# Patient Record
Sex: FEMALE | Race: WHITE
[De-identification: names, ages, dates, MRNs, and addresses within clinical notes are randomized per-mention and may not be internally consistent; named-entity substitution may affect disease eponyms.]

---

## 2022-03-18 PROBLEM — M77.8 TENDONITIS OF ELBOW, RIGHT: Status: ACTIVE | Noted: 2021-07-14

## 2022-03-18 PROBLEM — E66.01 SEVERE OBESITY (HCC): Status: ACTIVE | Noted: 2019-10-09

## 2022-03-19 PROBLEM — I10 ESSENTIAL HYPERTENSION: Status: ACTIVE | Noted: 2021-07-14

## 2022-03-19 PROBLEM — N93.9 DISORDER OF MENSTRUAL BLEEDING: Status: ACTIVE | Noted: 2019-10-09

## 2022-03-19 PROBLEM — F90.0 ADHD, PREDOMINANTLY INATTENTIVE TYPE: Status: ACTIVE | Noted: 2021-07-14

## 2022-03-19 PROBLEM — Z30.09 FAMILY PLANNING: Status: ACTIVE | Noted: 2019-10-09

## 2022-03-19 PROBLEM — F90.9 ADHD: Status: ACTIVE | Noted: 2019-10-09

## 2022-03-19 PROBLEM — N92.6 DISORDER OF MENSTRUAL BLEEDING: Status: ACTIVE | Noted: 2019-10-09

## 2022-06-06 ENCOUNTER — TELEPHONE (OUTPATIENT)
Dept: PRIMARY CARE CLINIC | Facility: CLINIC | Age: 32
End: 2022-06-06

## 2022-06-06 DIAGNOSIS — F90.0 ADHD, PREDOMINANTLY INATTENTIVE TYPE: Primary | ICD-10-CM

## 2022-06-06 RX ORDER — DEXTROAMPHETAMINE SACCHARATE, AMPHETAMINE ASPARTATE, DEXTROAMPHETAMINE SULFATE AND AMPHETAMINE SULFATE 7.5; 7.5; 7.5; 7.5 MG/1; MG/1; MG/1; MG/1
TABLET ORAL
Qty: 60 TABLET | Refills: 0 | Status: SHIPPED | OUTPATIENT
Start: 2022-06-06 | End: 2022-07-14 | Stop reason: SDUPTHER

## 2022-06-06 NOTE — TELEPHONE ENCOUNTER
: Message to Provider     QUESTIONS   Information for Provider? Patient attempted to schedule her regular appt   for refills on controlled substance and first available appt offered was   6/22 which is AFTER her meds will run out. She'll be out on THIS friday   6/10. She said she's repeated attempted to reach out through [address], but   the system is all messed up. Patient will also schedule her NEXT 3mo visit   as well.   ---------------------------------------------------------------------------   --------------   CALL BACK INFO   What is the best way for the office to contact you? OK to leave message on   voicemail   Preferred Call Back Phone Number? [phone]   ---------------------------------------------------------------------------   --------------   SCRIPT ANSWERS   Relationship to Patient?  Self

## 2022-07-14 ENCOUNTER — TELEMEDICINE (OUTPATIENT)
Dept: PRIMARY CARE CLINIC | Facility: CLINIC | Age: 32
End: 2022-07-14

## 2022-07-14 DIAGNOSIS — M77.8 TENDONITIS OF ELBOW, RIGHT: Primary | ICD-10-CM

## 2022-07-14 DIAGNOSIS — F90.0 ADHD, PREDOMINANTLY INATTENTIVE TYPE: ICD-10-CM

## 2022-07-14 DIAGNOSIS — I10 ESSENTIAL HYPERTENSION: ICD-10-CM

## 2022-07-14 PROCEDURE — 99442 PR PHYS/QHP TELEPHONE EVALUATION 11-20 MIN: CPT | Performed by: FAMILY MEDICINE

## 2022-07-14 RX ORDER — DEXTROAMPHETAMINE SACCHARATE, AMPHETAMINE ASPARTATE, DEXTROAMPHETAMINE SULFATE AND AMPHETAMINE SULFATE 7.5; 7.5; 7.5; 7.5 MG/1; MG/1; MG/1; MG/1
TABLET ORAL
Qty: 60 TABLET | Refills: 0 | Status: SHIPPED | OUTPATIENT
Start: 2022-09-14 | End: 2022-09-20 | Stop reason: SDUPTHER

## 2022-07-14 RX ORDER — DEXTROAMPHETAMINE SACCHARATE, AMPHETAMINE ASPARTATE, DEXTROAMPHETAMINE SULFATE AND AMPHETAMINE SULFATE 7.5; 7.5; 7.5; 7.5 MG/1; MG/1; MG/1; MG/1
TABLET ORAL
Qty: 60 TABLET | Refills: 0 | Status: SHIPPED | OUTPATIENT
Start: 2022-08-14 | End: 2022-10-19 | Stop reason: SDUPTHER

## 2022-07-14 RX ORDER — DEXTROAMPHETAMINE SACCHARATE, AMPHETAMINE ASPARTATE, DEXTROAMPHETAMINE SULFATE AND AMPHETAMINE SULFATE 7.5; 7.5; 7.5; 7.5 MG/1; MG/1; MG/1; MG/1
TABLET ORAL
Qty: 60 TABLET | Refills: 0 | Status: SHIPPED | OUTPATIENT
Start: 2022-07-14 | End: 2022-10-19 | Stop reason: SDUPTHER

## 2022-07-14 RX ORDER — LISINOPRIL 5 MG/1
5 TABLET ORAL DAILY
Qty: 90 TABLET | Refills: 3 | Status: SHIPPED | OUTPATIENT
Start: 2022-07-14

## 2022-07-14 NOTE — PROGRESS NOTES
Lewis De Oliveira (:  1990) is a Established patient, here for evaluation of the following:    Assessment & Plan   Below is the assessment and plan developed based on review of pertinent history, physical exam, labs, studies, and medications. 1. ADHD, predominantly inattentive type  -     amphetamine-dextroamphetamine (ADDERALL) 30 MG tablet; One tablet po daily and 1/2 tablet po at noon, 1/2 tablet po in pm prn (date correction), Disp-60 tablet, R-0Normal  -     amphetamine-dextroamphetamine (ADDERALL, 30MG,) 30 MG tablet; One tablet in am po and 1/2 tablet at noon and 1/2 tablet at pm, Disp-60 tablet, R-0Normal  -     amphetamine-dextroamphetamine (ADDERALL, 30MG,) 30 MG tablet; One tablet in am and 1/2 at noon, and 1/2 po in pm, Disp-60 tablet, R-0Normal  Return recheck cmp, lipid, cbc, tsh, free t4, hgba1c, vit d, keep scheduled appt, for for labs one week before appt. Subjective   HPI:  26 yo female who presents for follow up visit, medication refills. Pt reports she is doing okay on current medication therapy, but does need refills on all meds. Pt is given adderall 30mg bid dosing. Pt reports no adverse side effects, needs medication and is aware when she does not have it. Refills are given. Pt is also given refills on voltaren gel for joint pain (arthritis) multiple areas, and on lisinopril  5mg for BP management. Pt reports stable on these meds. Pt does not need refills on BCPs at this time. Pt is advised RTC in 3 months, recheck, med refills. Labs are indicated, cmp, lipid, cbc, tsh, free t4, hgba1c, vit d    Review of Systems   Constitutional: Negative. Pt is needing medication refills, doing okay on current therapy, per pt report. HENT: Negative. Eyes: Negative. Respiratory: Negative. Cardiovascular: Negative. Elevated BP without meds   Gastrointestinal: Negative. Endocrine: Negative. Genitourinary: Negative.     Musculoskeletal:  Positive for arthralgias. Negative for joint swelling. Joint pain (arthritis)   Allergic/Immunologic: Negative. Neurological: Negative. Hematological: Negative. Psychiatric/Behavioral:          Adult ADHD, stable on medications        Objective   Patient-Reported Vitals  No data recorded            On this date 7/14/2022 I have spent 11-20 minutes reviewing previous notes, test results and face to face (virtual) with the patient discussing the diagnosis and importance of compliance with the treatment plan as well as documenting on the day of the visit. [address], was evaluated through a synchronous (real-time) audio-video encounter. The patient (or guardian if applicable) is aware that this is a billable service, which includes applicable co-pays. This Virtual Visit was conducted with patient's (and/or legal guardian's) consent. The visit was conducted pursuant to the emergency declaration under the [facility], [address] [place] [address] [facility] authority and the [employer] and [employer] General Act. Patient identification was verified, and a caregiver was present when appropriate. The patient was located at Home: [address] [place] [id number]. Provider was located at [facility] Rhys [address] [place] [address]..         --Pool Oneil MD

## 2022-07-15 ASSESSMENT — ENCOUNTER SYMPTOMS
EYES NEGATIVE: 1
ALLERGIC/IMMUNOLOGIC NEGATIVE: 1
RESPIRATORY NEGATIVE: 1
GASTROINTESTINAL NEGATIVE: 1

## 2022-09-20 ENCOUNTER — TELEPHONE (OUTPATIENT)
Dept: PRIMARY CARE CLINIC | Facility: CLINIC | Age: 32
End: 2022-09-20

## 2022-09-20 DIAGNOSIS — F90.0 ADHD, PREDOMINANTLY INATTENTIVE TYPE: ICD-10-CM

## 2022-09-20 RX ORDER — DEXTROAMPHETAMINE SACCHARATE, AMPHETAMINE ASPARTATE, DEXTROAMPHETAMINE SULFATE AND AMPHETAMINE SULFATE 7.5; 7.5; 7.5; 7.5 MG/1; MG/1; MG/1; MG/1
TABLET ORAL
Qty: 60 TABLET | Refills: 0 | Status: SHIPPED | OUTPATIENT
Start: 2022-09-20 | End: 2022-10-19 | Stop reason: SDUPTHER

## 2022-09-20 NOTE — PROGRESS NOTES
Adderall sent to [facility]. Pthas one month of med remaining only dated 9/14. Requires appt to obtain another refill.

## 2022-09-20 NOTE — TELEPHONE ENCOUNTER
Pt called in stating that she needs her adderall to [facility] [address]      Please call pt back

## 2022-10-19 ENCOUNTER — E-VISIT (OUTPATIENT)
Dept: PRIMARY CARE CLINIC | Facility: CLINIC | Age: 32
End: 2022-10-19

## 2022-10-19 DIAGNOSIS — F90.0 ADHD, PREDOMINANTLY INATTENTIVE TYPE: ICD-10-CM

## 2022-10-19 PROCEDURE — 99442 PR PHYS/QHP TELEPHONE EVALUATION 11-20 MIN: CPT | Performed by: FAMILY MEDICINE

## 2022-10-19 RX ORDER — DEXTROAMPHETAMINE SACCHARATE, AMPHETAMINE ASPARTATE, DEXTROAMPHETAMINE SULFATE AND AMPHETAMINE SULFATE 7.5; 7.5; 7.5; 7.5 MG/1; MG/1; MG/1; MG/1
TABLET ORAL
Qty: 60 TABLET | Refills: 0 | Status: SHIPPED | OUTPATIENT
Start: 2022-10-19 | End: 2022-11-19

## 2022-10-19 RX ORDER — DEXTROAMPHETAMINE SACCHARATE, AMPHETAMINE ASPARTATE, DEXTROAMPHETAMINE SULFATE AND AMPHETAMINE SULFATE 7.5; 7.5; 7.5; 7.5 MG/1; MG/1; MG/1; MG/1
TABLET ORAL
Qty: 60 TABLET | Refills: 0 | Status: SHIPPED | OUTPATIENT
Start: 2022-12-19 | End: 2023-01-19

## 2022-10-19 RX ORDER — DEXTROAMPHETAMINE SACCHARATE, AMPHETAMINE ASPARTATE, DEXTROAMPHETAMINE SULFATE AND AMPHETAMINE SULFATE 7.5; 7.5; 7.5; 7.5 MG/1; MG/1; MG/1; MG/1
TABLET ORAL
Qty: 60 TABLET | Refills: 0 | Status: SHIPPED | OUTPATIENT
Start: 2022-11-19 | End: 2022-12-18

## 2022-10-19 NOTE — PROGRESS NOTES
Lewis De Oliveira (:  1990) is a Established patient, here for evaluation of the following:    Assessment & Plan   Below is the assessment and plan developed based on review of pertinent history, physical exam, labs, studies, and medications. {There are no diagnoses linked to this encounter. (Refresh or delete this SmartLink)}  No follow-ups on file. Subjective   HPI:   [facility]  Mom breast    [facility], [place], [place]    Pt will be seeing an obgyn on the  will have a pap, and bleeding   Review of Systems       Objective   Patient-Reported Vitals  No data recorded     Physical Exam  {Virtual visit PE with detailed exam Optional:522003654}       {Time Documentation Optional:415940450}    Lewis De Oliveira, was evaluated through a synchronous (real-time) audio-video encounter. The patient (or guardian if applicable) is aware that this is a billable service, which includes applicable co-pays. This Virtual Visit was conducted with patient's (and/or legal guardian's) consent. The visit was conducted pursuant to the emergency declaration under the [address] [place] [address] [facility] authority and the [employer] and [facility] General Act. Patient identification was verified, and a caregiver was present when appropriate. The patient was located at {[facility] [employer] - Patient:[id number]}. Provider was located at {[employer] - Provider:[id number]}.         --Blair Blake MD

## 2022-10-20 NOTE — PROGRESS NOTES
Abbey No is a 27 y.o. female evaluated via telephone on 10/19/2022 for No chief complaint on file. .        Documentation:  I communicated with the patient and/or health care decision maker about medication refills and other health concerns. Pt is worried about breast cancer and will be seeing gyn physician to discuss surveillance when she has pap. Advised to discuss BRACA gene and if GYN felt this was something to be considered in their discussion or not, as this would have health implications, but if not present would change course of treatment indicated. Pt presents for refills of adderall, and is given two tablets 20mg daily, to take one tablet in am, 1/2 tablet at noon and 1/2 tablet in pm for best coverage of day, best results. Pt has done this before and feels is best way for her to use medication. Pt is given refills of med, 3 months. Pt will RTC in 3 months, for recheck, telemed is okay. .   Details of this discussion including any medical advice provided: as noted    Total Time: minutes: 11-20 minutes    Abbey No was evaluated through a synchronous (real-time) audio encounter. Patient identification was verified at the start of the visit. She (or guardian if applicable) is aware that this is a billable service, which includes applicable co-pays. This visit was conducted with the patient's (and/or legal guardian's) verbal consent. She has not had a related appointment within my department in the past 7 days or scheduled within the next 24 hours. The patient was located at Home: [address] [place] [address]. The provider was located at [facility] ([address] [place], [place] [address]t): [facility]  [facility]..     Note: not billable if this call serves to triage the patient into an appointment for the relevant concern    Bo Priest MD

## 2022-12-19 ENCOUNTER — TELEPHONE (OUTPATIENT)
Dept: PRIMARY CARE CLINIC | Facility: CLINIC | Age: 32
End: 2022-12-19

## 2022-12-19 DIAGNOSIS — F90.0 ADHD, PREDOMINANTLY INATTENTIVE TYPE: ICD-10-CM

## 2022-12-19 RX ORDER — DEXTROAMPHETAMINE SACCHARATE, AMPHETAMINE ASPARTATE, DEXTROAMPHETAMINE SULFATE AND AMPHETAMINE SULFATE 7.5; 7.5; 7.5; 7.5 MG/1; MG/1; MG/1; MG/1
TABLET ORAL
Qty: 60 TABLET | Refills: 0 | Status: SHIPPED | OUTPATIENT
Start: 2022-12-19 | End: 2023-01-19

## 2023-01-20 ENCOUNTER — TELEPHONE (OUTPATIENT)
Dept: PRIMARY CARE CLINIC | Facility: CLINIC | Age: 33
End: 2023-01-20

## 2023-01-20 ENCOUNTER — TELEMEDICINE (OUTPATIENT)
Dept: PRIMARY CARE CLINIC | Facility: CLINIC | Age: 33
End: 2023-01-20

## 2023-01-20 DIAGNOSIS — M77.8 TENDONITIS OF ELBOW, RIGHT: ICD-10-CM

## 2023-01-20 DIAGNOSIS — F90.0 ADHD, PREDOMINANTLY INATTENTIVE TYPE: Primary | ICD-10-CM

## 2023-01-20 DIAGNOSIS — F90.0 ADHD, PREDOMINANTLY INATTENTIVE TYPE: ICD-10-CM

## 2023-01-20 DIAGNOSIS — Z90.710 S/P HYSTERECTOMY: ICD-10-CM

## 2023-01-20 PROCEDURE — 99442 PR PHYS/QHP TELEPHONE EVALUATION 11-20 MIN: CPT | Performed by: FAMILY MEDICINE

## 2023-01-20 RX ORDER — DEXTROAMPHETAMINE SACCHARATE, AMPHETAMINE ASPARTATE, DEXTROAMPHETAMINE SULFATE AND AMPHETAMINE SULFATE 7.5; 7.5; 7.5; 7.5 MG/1; MG/1; MG/1; MG/1
TABLET ORAL
Qty: 60 TABLET | Refills: 0 | Status: SHIPPED | OUTPATIENT
Start: 2023-02-21 | End: 2023-03-20

## 2023-01-20 RX ORDER — DEXTROAMPHETAMINE SACCHARATE, AMPHETAMINE ASPARTATE, DEXTROAMPHETAMINE SULFATE AND AMPHETAMINE SULFATE 7.5; 7.5; 7.5; 7.5 MG/1; MG/1; MG/1; MG/1
TABLET ORAL
Qty: 60 TABLET | Refills: 0 | Status: SHIPPED | OUTPATIENT
Start: 2023-03-21 | End: 2023-04-21

## 2023-01-20 RX ORDER — DEXTROAMPHETAMINE SACCHARATE, AMPHETAMINE ASPARTATE, DEXTROAMPHETAMINE SULFATE AND AMPHETAMINE SULFATE 7.5; 7.5; 7.5; 7.5 MG/1; MG/1; MG/1; MG/1
TABLET ORAL
Qty: 60 TABLET | Refills: 0 | Status: SHIPPED | OUTPATIENT
Start: 2023-01-20 | End: 2023-02-20

## 2023-01-20 RX ORDER — DEXTROAMPHETAMINE SACCHARATE, AMPHETAMINE ASPARTATE, DEXTROAMPHETAMINE SULFATE AND AMPHETAMINE SULFATE 7.5; 7.5; 7.5; 7.5 MG/1; MG/1; MG/1; MG/1
TABLET ORAL
Qty: 60 TABLET | Refills: 0 | Status: SHIPPED | OUTPATIENT
Start: 2023-02-21 | End: 2023-01-20 | Stop reason: SDUPTHER

## 2023-01-20 NOTE — TELEPHONE ENCOUNTER
Patient called regarding her prescription. [facility] received prescription for March and February but did not see the one for January. Requesting that this be sent. I advised that I would get this message to Dr. Hernandez Heart today.

## 2023-01-20 NOTE — PROGRESS NOTES
Willard Mora is a 27 y.o. female evaluated via telephone on 1/20/2023 for No chief complaint on file. .        Documentation:  I communicated with the patient and/or health care decision maker about medication refills, adderall for Adult ADHD. Pt reports some difficulty obtaining her meds, sometimes from [facility], due to shortage of supply. Pt will let physician know if she needs resent to a different pharmacy when due to be filled. Pt is also given voltaren 1%, for joint pain. Pt reports a major life event with hysterectomy and removal of her uterus which had cancer cells internal to uterus. Pt says was an emotional time, but she has completed her family and is \"glad to have it out. \" As she feels much better overall and was told \"the cancer is all gone, completely removed with uterus. \"    Pt is commended for her maturity in this life event. Pt will RTC in 3 months for recheck, medication refills. Pt reports medication is working appropriately for her with no side effects of note. Details of this discussion including any medical advice provided: as noted. Total Time: minutes: 11-20 minutes    Willard Mora was evaluated through a synchronous (real-time) audio encounter. Patient identification was verified at the start of the visit. She (or guardian if applicable) is aware that this is a billable service, which includes applicable co-pays. This visit was conducted with the patient's (and/or legal guardian's) verbal consent. She has not had a related appointment within my department in the past 7 days or scheduled within the next 24 hours. The patient was located at Home: [address] [place] [address]. The provider was located at [facility] ([address] [place] [address] Dept): Rhys ,  [facility] 14..     Note: not billable if this call serves to triage the patient into an appointment for the relevant concern    Vadim Leos MD

## 2023-01-20 NOTE — TELEPHONE ENCOUNTER
Called [facility] to check her out of her appt, update her insurance, and schedule her next visit.  She did not answer so I left a message for her to call back

## 2023-01-23 DIAGNOSIS — F90.0 ADHD, PREDOMINANTLY INATTENTIVE TYPE: ICD-10-CM

## 2023-01-23 RX ORDER — DEXTROAMPHETAMINE SACCHARATE, AMPHETAMINE ASPARTATE, DEXTROAMPHETAMINE SULFATE AND AMPHETAMINE SULFATE 7.5; 7.5; 7.5; 7.5 MG/1; MG/1; MG/1; MG/1
TABLET ORAL
Qty: 60 TABLET | Refills: 0 | Status: SHIPPED | OUTPATIENT
Start: 2023-01-23 | End: 2023-02-23

## 2023-01-23 NOTE — PROGRESS NOTES
January adderall script, initially sent on 1/20/23 resent today 1/23/23. Uncertain why did not arrive on first try. Sent to [facility] as requested. It is possible was sent intially to [facility], uncertain.

## 2023-04-20 ENCOUNTER — TELEPHONE (OUTPATIENT)
Dept: PRIMARY CARE CLINIC | Facility: CLINIC | Age: 33
End: 2023-04-20

## 2023-04-20 DIAGNOSIS — F90.0 ADHD, PREDOMINANTLY INATTENTIVE TYPE: ICD-10-CM

## 2023-04-20 RX ORDER — DEXTROAMPHETAMINE SACCHARATE, AMPHETAMINE ASPARTATE, DEXTROAMPHETAMINE SULFATE AND AMPHETAMINE SULFATE 7.5; 7.5; 7.5; 7.5 MG/1; MG/1; MG/1; MG/1
TABLET ORAL
Qty: 60 TABLET | Refills: 0 | Status: SHIPPED | OUTPATIENT
Start: 2023-04-20 | End: 2023-05-21

## 2023-04-26 ENCOUNTER — TELEMEDICINE (OUTPATIENT)
Dept: PRIMARY CARE CLINIC | Facility: CLINIC | Age: 33
End: 2023-04-26

## 2023-04-26 ENCOUNTER — TELEPHONE (OUTPATIENT)
Dept: PRIMARY CARE CLINIC | Facility: CLINIC | Age: 33
End: 2023-04-26

## 2023-04-26 DIAGNOSIS — F90.0 ADHD, PREDOMINANTLY INATTENTIVE TYPE: Primary | ICD-10-CM

## 2023-04-26 DIAGNOSIS — I10 ESSENTIAL HYPERTENSION: ICD-10-CM

## 2023-04-26 DIAGNOSIS — Z90.710 S/P HYSTERECTOMY: ICD-10-CM

## 2023-04-26 PROBLEM — Z30.09 FAMILY PLANNING: Status: RESOLVED | Noted: 2019-10-09 | Resolved: 2023-04-26

## 2023-04-26 PROBLEM — N92.6 DISORDER OF MENSTRUAL BLEEDING: Status: RESOLVED | Noted: 2019-10-09 | Resolved: 2023-04-26

## 2023-04-26 PROBLEM — N93.9 DISORDER OF MENSTRUAL BLEEDING: Status: RESOLVED | Noted: 2019-10-09 | Resolved: 2023-04-26

## 2023-04-26 PROCEDURE — 99214 OFFICE O/P EST MOD 30 MIN: CPT | Performed by: FAMILY MEDICINE

## 2023-04-26 RX ORDER — DEXTROAMPHETAMINE SACCHARATE, AMPHETAMINE ASPARTATE, DEXTROAMPHETAMINE SULFATE AND AMPHETAMINE SULFATE 7.5; 7.5; 7.5; 7.5 MG/1; MG/1; MG/1; MG/1
TABLET ORAL
Qty: 60 TABLET | Refills: 0 | Status: SHIPPED | OUTPATIENT
Start: 2023-06-28 | End: 2023-07-27

## 2023-04-26 RX ORDER — DEXTROAMPHETAMINE SACCHARATE, AMPHETAMINE ASPARTATE, DEXTROAMPHETAMINE SULFATE AND AMPHETAMINE SULFATE 7.5; 7.5; 7.5; 7.5 MG/1; MG/1; MG/1; MG/1
TABLET ORAL
Qty: 60 TABLET | Refills: 0 | Status: SHIPPED | OUTPATIENT
Start: 2023-05-27 | End: 2023-06-27

## 2023-04-26 RX ORDER — DEXTROAMPHETAMINE SACCHARATE, AMPHETAMINE ASPARTATE, DEXTROAMPHETAMINE SULFATE AND AMPHETAMINE SULFATE 7.5; 7.5; 7.5; 7.5 MG/1; MG/1; MG/1; MG/1
TABLET ORAL
Qty: 60 TABLET | Refills: 0 | Status: SHIPPED | OUTPATIENT
Start: 2023-04-26 | End: 2023-05-26

## 2023-04-26 RX ORDER — LISINOPRIL 5 MG/1
5 TABLET ORAL DAILY
Qty: 90 TABLET | Refills: 3 | Status: SHIPPED | OUTPATIENT
Start: 2023-04-26

## 2023-04-26 SDOH — ECONOMIC STABILITY: HOUSING INSECURITY
IN THE LAST 12 MONTHS, WAS THERE A TIME WHEN YOU DID NOT HAVE A STEADY PLACE TO SLEEP OR SLEPT IN A SHELTER (INCLUDING NOW)?: NO

## 2023-04-26 SDOH — ECONOMIC STABILITY: INCOME INSECURITY: HOW HARD IS IT FOR YOU TO PAY FOR THE VERY BASICS LIKE FOOD, HOUSING, MEDICAL CARE, AND HEATING?: NOT HARD AT ALL

## 2023-04-26 SDOH — ECONOMIC STABILITY: FOOD INSECURITY: WITHIN THE PAST 12 MONTHS, YOU WORRIED THAT YOUR FOOD WOULD RUN OUT BEFORE YOU GOT MONEY TO BUY MORE.: NEVER TRUE

## 2023-04-26 SDOH — ECONOMIC STABILITY: FOOD INSECURITY: WITHIN THE PAST 12 MONTHS, THE FOOD YOU BOUGHT JUST DIDN'T LAST AND YOU DIDN'T HAVE MONEY TO GET MORE.: NEVER TRUE

## 2023-04-26 ASSESSMENT — PATIENT HEALTH QUESTIONNAIRE - PHQ9
2. FEELING DOWN, DEPRESSED OR HOPELESS: 0
SUM OF ALL RESPONSES TO PHQ QUESTIONS 1-9: 0
SUM OF ALL RESPONSES TO PHQ9 QUESTIONS 1 & 2: 0
SUM OF ALL RESPONSES TO PHQ QUESTIONS 1-9: 0
1. LITTLE INTEREST OR PLEASURE IN DOING THINGS: 0

## 2023-04-26 ASSESSMENT — ENCOUNTER SYMPTOMS
EYES NEGATIVE: 1
RESPIRATORY NEGATIVE: 1
ALLERGIC/IMMUNOLOGIC NEGATIVE: 1
GASTROINTESTINAL NEGATIVE: 1

## 2023-04-26 NOTE — PROGRESS NOTES
Lewis De Oliveira (:  1990) is a Established patient, presenting virtually for evaluation of the following:    Assessment & Plan   Below is the assessment and plan developed based on review of pertinent history, physical exam, labs, studies, and medications. 1. ADHD, predominantly inattentive type  -     amphetamine-dextroamphetamine (ADDERALL, 30MG,) 30 MG tablet; One tablet in am po and 1/2 tablet at noon and 1/2 tablet at pm, Disp-60 tablet, R-0Normal  -     amphetamine-dextroamphetamine (ADDERALL, 30MG,) 30 MG tablet; One tablet in am and 1/2 at noon, and 1/2 po in pm, visit required for additional refills. , Disp-60 tablet, R-0Normal  -     amphetamine-dextroamphetamine (ADDERALL) 30 MG tablet; One tablet po daily and 1/2 tablet po at noon, 1/2 tablet po in pm prn, Disp-60 tablet, R-0Normal  2. Essential hypertension  -     lisinopril (PRINIVIL;ZESTRIL) 5 MG tablet; Take 1 tablet by mouth daily, Disp-90 tablet, R-3Normal  3. S/P hysterectomy    Return in about 3 months (around 2023), or no labs indicated, due to labs from  [facility]. Subjective   HPI:34 yo female who presents for follow up visit. Pt is needing refills on adderall which have . Pt reports she needs three months of medications, adderall 30mg bid dose, which are sent to [facility] pharmacy, as per request. Pt reports no problems with current medications. Pt is also given refills on lisinopril for BP management. Pt reports medication is working well overall. Pt will continue to monitor BP at home and on any clinic visits, BP stable at this time on current meds. Pt reports she has had a recent history of Uterine cancer. Uterus is removed. Hysterectomy, pathology showed cancer cells. Pt says she has one child age 13, a girl who is having the normal \"angst\" and \"drama\" of the teenage years. Pt says she enjoys being a mom but at times it is a \"challenge. \"    Pt will RTC in 3 months, med refills.  no labs

## 2023-06-26 ENCOUNTER — TELEPHONE (OUTPATIENT)
Dept: PRIMARY CARE CLINIC | Facility: CLINIC | Age: 33
End: 2023-06-26

## 2023-06-27 ENCOUNTER — TELEPHONE (OUTPATIENT)
Dept: PRIMARY CARE CLINIC | Facility: CLINIC | Age: 33
End: 2023-06-27

## 2023-07-28 ENCOUNTER — TELEPHONE (OUTPATIENT)
Dept: PRIMARY CARE CLINIC | Facility: CLINIC | Age: 33
End: 2023-07-28

## 2023-07-28 ENCOUNTER — TELEMEDICINE (OUTPATIENT)
Dept: PRIMARY CARE CLINIC | Facility: CLINIC | Age: 33
End: 2023-07-28

## 2023-07-28 DIAGNOSIS — F90.0 ADHD, PREDOMINANTLY INATTENTIVE TYPE: Primary | ICD-10-CM

## 2023-07-28 DIAGNOSIS — Z90.710 S/P HYSTERECTOMY: ICD-10-CM

## 2023-07-28 PROCEDURE — 99442 PR PHYS/QHP TELEPHONE EVALUATION 11-20 MIN: CPT | Performed by: FAMILY MEDICINE

## 2023-07-28 RX ORDER — DEXTROAMPHETAMINE SACCHARATE, AMPHETAMINE ASPARTATE, DEXTROAMPHETAMINE SULFATE AND AMPHETAMINE SULFATE 7.5; 7.5; 7.5; 7.5 MG/1; MG/1; MG/1; MG/1
TABLET ORAL
Qty: 60 TABLET | Refills: 0 | Status: SHIPPED | OUTPATIENT
Start: 2023-07-28 | End: 2023-08-27

## 2023-07-28 RX ORDER — DEXTROAMPHETAMINE SACCHARATE, AMPHETAMINE ASPARTATE, DEXTROAMPHETAMINE SULFATE AND AMPHETAMINE SULFATE 7.5; 7.5; 7.5; 7.5 MG/1; MG/1; MG/1; MG/1
TABLET ORAL
Qty: 60 TABLET | Refills: 0 | Status: SHIPPED | OUTPATIENT
Start: 2023-08-28 | End: 2023-09-29

## 2023-07-28 RX ORDER — DEXTROAMPHETAMINE SACCHARATE, AMPHETAMINE ASPARTATE, DEXTROAMPHETAMINE SULFATE AND AMPHETAMINE SULFATE 7.5; 7.5; 7.5; 7.5 MG/1; MG/1; MG/1; MG/1
TABLET ORAL
Qty: 60 TABLET | Refills: 0 | Status: SHIPPED | OUTPATIENT
Start: 2023-09-30 | End: 2023-10-30

## 2023-07-28 NOTE — TELEPHONE ENCOUNTER
Called pt to schedule follow up and verify insurance, left vm. Return in about 3 months (around 10/28/2023), or RTC in 3 months, in office visit, labs are not indicated, already done at [facility]. Edy Tian

## 2023-07-28 NOTE — PROGRESS NOTES
Stephan Mir (:  1990) is a Established patient, presenting virtually for evaluation of the following:    Assessment & Plan   Below is the assessment and plan developed based on review of pertinent history, physical exam, labs, studies, and medications. {There are no diagnoses linked to this encounter. (Refresh or delete this SmartLink)}  No follow-ups on file. Subjective   HPI: 25 yo female    Review of Systems       Objective   Patient-Reported Vitals  No data recorded       {Virtual visit PE with detailed exam Optional:[id number]}           Stephan Mir, was evaluated through a synchronous (real-time) audio-video encounter. The patient (or guardian if applicable) is aware that this is a billable service, which includes applicable co-pays. This Virtual Visit was conducted with patient's (and/or legal guardian's) consent. Patient identification was verified, and a caregiver was present when appropriate.    The patient was located at Home: [address] [place] [phone]  Provider was located at Facility ([address] [place], [place] [address]t): [address] [place] [address],  [facility] Andre Irby         --Lisset Stahl MD

## 2023-07-28 NOTE — PROGRESS NOTES
Shayy Webster is a 26 y.o. female evaluated via telephone on 7/28/2023 for No chief complaint on file. .        Documentation:  I communicated with the patient and/or health care decision maker about medication refills. Pt has been released from oncology services at [facility], is doing well overall. Pt is receiving medications from this physician for Adult ADHD. Pt reports she is not having any concerning side effects and the medications are working  for her as expected, with better focus and attention. Pt has a full panel of labs done at [facility] in December, 22 and is up to date on all labs, so these will not be needed at this clinic this  year. Pt will RTC in 3 months, recheck, in office visit, labs not indicated, med refills only. .   Details of this discussion including any medical advice provided: as noted    Total Time: minutes: 11-20 minutes    Shayy Webster was evaluated through a synchronous (real-time) audio encounter. Patient identification was verified at the start of the visit. She (or guardian if applicable) is aware that this is a billable service, which includes applicable co-pays. This visit was conducted with the patient's (and/or legal guardian's) verbal consent. She has not had a related appointment within my department in the past 7 days or scheduled within the next 24 hours. The patient was located at Home: [address] [place] [phone]. The provider was located at [facility] ([facility] Dept): [facility],  [address] [place] [address].     Note: not billable if this call serves to triage the patient into an appointment for the relevant concern    Leonel Harper MD

## 2023-10-27 ENCOUNTER — TELEMEDICINE (OUTPATIENT)
Dept: PRIMARY CARE CLINIC | Facility: CLINIC | Age: 33
End: 2023-10-27

## 2023-10-27 ENCOUNTER — TELEPHONE (OUTPATIENT)
Dept: PRIMARY CARE CLINIC | Facility: CLINIC | Age: 33
End: 2023-10-27

## 2023-10-27 DIAGNOSIS — I10 ESSENTIAL HYPERTENSION: ICD-10-CM

## 2023-10-27 DIAGNOSIS — J01.00 ACUTE NON-RECURRENT MAXILLARY SINUSITIS: Primary | ICD-10-CM

## 2023-10-27 DIAGNOSIS — F90.0 ADHD, PREDOMINANTLY INATTENTIVE TYPE: ICD-10-CM

## 2023-10-27 PROCEDURE — 99214 OFFICE O/P EST MOD 30 MIN: CPT | Performed by: FAMILY MEDICINE

## 2023-10-27 RX ORDER — DEXTROAMPHETAMINE SACCHARATE, AMPHETAMINE ASPARTATE, DEXTROAMPHETAMINE SULFATE AND AMPHETAMINE SULFATE 7.5; 7.5; 7.5; 7.5 MG/1; MG/1; MG/1; MG/1
TABLET ORAL
Qty: 60 TABLET | Refills: 0 | Status: SHIPPED | OUTPATIENT
Start: 2023-12-26 | End: 2024-01-25

## 2023-10-27 RX ORDER — DEXTROAMPHETAMINE SACCHARATE, AMPHETAMINE ASPARTATE, DEXTROAMPHETAMINE SULFATE AND AMPHETAMINE SULFATE 7.5; 7.5; 7.5; 7.5 MG/1; MG/1; MG/1; MG/1
TABLET ORAL
Qty: 60 TABLET | Refills: 0 | Status: SHIPPED | OUTPATIENT
Start: 2023-10-27 | End: 2023-11-26

## 2023-10-27 RX ORDER — AZITHROMYCIN 500 MG/1
500 TABLET, FILM COATED ORAL DAILY
Qty: 6 TABLET | Refills: 0 | Status: SHIPPED | OUTPATIENT
Start: 2023-10-27 | End: 2023-11-02

## 2023-10-27 RX ORDER — DEXTROAMPHETAMINE SACCHARATE, AMPHETAMINE ASPARTATE, DEXTROAMPHETAMINE SULFATE AND AMPHETAMINE SULFATE 7.5; 7.5; 7.5; 7.5 MG/1; MG/1; MG/1; MG/1
TABLET ORAL
Qty: 60 TABLET | Refills: 0 | Status: SHIPPED | OUTPATIENT
Start: 2023-11-26 | End: 2023-12-26

## 2023-10-27 RX ORDER — METHYLPREDNISOLONE 4 MG/1
TABLET ORAL
Qty: 1 KIT | Refills: 0 | Status: SHIPPED | OUTPATIENT
Start: 2023-10-27 | End: 2023-11-02

## 2023-10-27 ASSESSMENT — ENCOUNTER SYMPTOMS
ALLERGIC/IMMUNOLOGIC NEGATIVE: 1
RESPIRATORY NEGATIVE: 1
SINUS PAIN: 1
EYES NEGATIVE: 1
SINUS PRESSURE: 1
GASTROINTESTINAL NEGATIVE: 1

## 2023-10-27 NOTE — TELEPHONE ENCOUNTER
Called pt to schedule follow up, left vm    Return in about 3 months (around 1/25/2024), or cmp, cbc, tsh, free t4, lipid, hgba1c, vit d, in office visit please, for for labs one week before appt

## 2023-10-27 NOTE — PROGRESS NOTES
Stephan Mir, was evaluated through a synchronous (real-time) audio-video encounter. The patient (or guardian if applicable) is aware that this is a billable service, which includes applicable co-pays. This Virtual Visit was conducted with patient's (and/or legal guardian's) consent. Patient identification was verified, and a caregiver was present when appropriate. The patient was located at Home: [address] [facility] [phone]  Provider was located at [address]): [address] [place] [address],  [address] [place] [address]      Stephan Mir (:  1990) is a Established patient, presenting virtually for evaluation of the following:    Assessment & Plan   Below is the assessment and plan developed based on review of pertinent history, physical exam, labs, studies, and medications. 1. Acute non-recurrent maxillary sinusitis  -     azithromycin (ZITHROMAX) 500 MG tablet; Take 1 tablet by mouth daily for 6 days, Disp-6 tablet, R-0Normal  -     methylPREDNISolone (MEDROL DOSEPACK) 4 MG tablet; Take by mouth., Disp-1 kit, R-0Normal  2. Essential hypertension  3. ADHD, predominantly inattentive type  -     amphetamine-dextroamphetamine (ADDERALL) 30 MG tablet; One tablet po daily and 1/2 tablet po at noon, 1/2 tablet po in pm prn, Disp-60 tablet, R-0Normal  -     amphetamine-dextroamphetamine (ADDERALL, 30MG,) 30 MG tablet; One tablet in am po and 1/2 tablet at noon and 1/2 tablet at pm, Disp-60 tablet, R-0Normal  -     amphetamine-dextroamphetamine (ADDERALL, 30MG,) 30 MG tablet; One tablet in am and 1/2 at noon, and 1/2 po in pm, visit required for additional refills. , Disp-60 tablet, R-0Normal    Return in about 3 months (around 2024), or cmp, cbc, tsh, free t4, lipid, hgba1c, vit d, for for labs one week before appt. Subjective   HPI:  25 yo female who presents for congestion. Pt says has been sick for a few weeks. Usually can knock out with OTC meds but not this time.   Pt reports

## 2024-01-26 ENCOUNTER — TELEPHONE (OUTPATIENT)
Dept: PRIMARY CARE CLINIC | Facility: CLINIC | Age: 34
End: 2024-01-26

## 2024-01-26 ENCOUNTER — TELEMEDICINE (OUTPATIENT)
Dept: PRIMARY CARE CLINIC | Facility: CLINIC | Age: 34
End: 2024-01-26

## 2024-01-26 DIAGNOSIS — I10 ESSENTIAL HYPERTENSION: ICD-10-CM

## 2024-01-26 DIAGNOSIS — F90.0 ADHD, PREDOMINANTLY INATTENTIVE TYPE: Primary | ICD-10-CM

## 2024-01-26 PROCEDURE — 99442 PR PHYS/QHP TELEPHONE EVALUATION 11-20 MIN: CPT | Performed by: FAMILY MEDICINE

## 2024-01-26 RX ORDER — DEXTROAMPHETAMINE SACCHARATE, AMPHETAMINE ASPARTATE, DEXTROAMPHETAMINE SULFATE AND AMPHETAMINE SULFATE 7.5; 7.5; 7.5; 7.5 MG/1; MG/1; MG/1; MG/1
TABLET ORAL
Qty: 60 TABLET | Refills: 0 | Status: SHIPPED | OUTPATIENT
Start: 2024-02-25 | End: 2024-03-25

## 2024-01-26 RX ORDER — DEXTROAMPHETAMINE SACCHARATE, AMPHETAMINE ASPARTATE, DEXTROAMPHETAMINE SULFATE AND AMPHETAMINE SULFATE 7.5; 7.5; 7.5; 7.5 MG/1; MG/1; MG/1; MG/1
TABLET ORAL
Qty: 60 TABLET | Refills: 0 | Status: SHIPPED | OUTPATIENT
Start: 2024-03-25 | End: 2024-04-25

## 2024-01-26 RX ORDER — DEXTROAMPHETAMINE SACCHARATE, AMPHETAMINE ASPARTATE, DEXTROAMPHETAMINE SULFATE AND AMPHETAMINE SULFATE 7.5; 7.5; 7.5; 7.5 MG/1; MG/1; MG/1; MG/1
TABLET ORAL
Qty: 60 TABLET | Refills: 0 | Status: SHIPPED | OUTPATIENT
Start: 2024-01-26 | End: 2024-02-25

## 2024-01-26 ASSESSMENT — PATIENT HEALTH QUESTIONNAIRE - PHQ9
SUM OF ALL RESPONSES TO PHQ QUESTIONS 1-9: 0
SUM OF ALL RESPONSES TO PHQ QUESTIONS 1-9: 0
1. LITTLE INTEREST OR PLEASURE IN DOING THINGS: 0
SUM OF ALL RESPONSES TO PHQ QUESTIONS 1-9: 0
SUM OF ALL RESPONSES TO PHQ QUESTIONS 1-9: 0
SUM OF ALL RESPONSES TO PHQ9 QUESTIONS 1 & 2: 0
2. FEELING DOWN, DEPRESSED OR HOPELESS: 0

## 2024-01-26 NOTE — TELEPHONE ENCOUNTER
Called pt to schedule follow up, left vm    Return in about 3 months (around 4/26/2024), or recehck, medication refills. cmp, lipid, cbc, tsh, free t4, hgba1c, vit d, for for labs one week before appt.

## 2024-01-26 NOTE — PROGRESS NOTES
Documentation:  I communicated with the patient and/or health care decision maker about medication refills.     34 yo female who presents for follow up visit.    Adult ADHD:    Adderall 30mg po daily in am, 1/2 tablet at noon and 1/2 tablet in pm.  Pt is given 3 months of medication refills.  Pt reports medication is working as expected with no particular concerns.  Continue current therapy.    Pt reports she obtained a voltaren gel recently and does not need refills.    Pt's BP medication lisinopril 5mg po daily will need to be refilled at next office visit.    Pt will RTC in 3 months, recheck, in office visit.    Cmp, lipid, cbc, tsh, free t4, hgba1c, vit d are indicated.    Pt reports she missed her last lab review as was called to serve on Jury duty.      Details of this discussion including any medical advice provided: as noted    Total Time: minutes: 11-20 minutes    Florina Carranza was evaluated through a synchronous (real-time) audio encounter. Patient identification was verified at the start of the visit. She (or guardian if applicable) is aware that this is a billable service, which includes applicable co-pays. This visit was conducted with the patient's (and/or legal guardian's) verbal consent. She has not had a related appointment within my department in the past 7 days or scheduled within the next 24 hours.   The patient was located at Home: [address] [place] [address] [place] [address].  The provider [facility] located at [facility] [address] [place] [address] [place] [address].    Note: not billable if this call serves to triage the patient into an appointment for the relevant concern    Florina Carranza is a 33 y.o. female evaluated via telephone on 1/26/2024 for No chief complaint on file.  .        Gregory Leggett Jr, MD

## 2024-02-20 ENCOUNTER — TELEMEDICINE (OUTPATIENT)
Dept: FAMILY MEDICINE CLINIC | Facility: CLINIC | Age: 34
End: 2024-02-20

## 2024-02-20 DIAGNOSIS — U07.1 COVID: Primary | ICD-10-CM

## 2024-02-20 PROCEDURE — 99213 OFFICE O/P EST LOW 20 MIN: CPT | Performed by: NURSE PRACTITIONER

## 2024-02-20 RX ORDER — BENZONATATE 200 MG/1
200 CAPSULE ORAL 3 TIMES DAILY PRN
Qty: 21 CAPSULE | Refills: 0 | Status: SHIPPED | OUTPATIENT
Start: 2024-02-20 | End: 2024-02-27

## 2024-02-20 RX ORDER — ALBUTEROL SULFATE 90 UG/1
2 AEROSOL, METERED RESPIRATORY (INHALATION) 4 TIMES DAILY PRN
Qty: 54 G | Refills: 0 | Status: SHIPPED | OUTPATIENT
Start: 2024-02-20

## 2024-02-20 SDOH — ECONOMIC STABILITY: HOUSING INSECURITY
IN THE LAST 12 MONTHS, WAS THERE A TIME WHEN YOU DID NOT HAVE A STEADY PLACE TO SLEEP OR SLEPT IN A SHELTER (INCLUDING NOW)?: PATIENT DECLINED

## 2024-02-20 SDOH — ECONOMIC STABILITY: FOOD INSECURITY: WITHIN THE PAST 12 MONTHS, THE FOOD YOU BOUGHT JUST DIDN'T LAST AND YOU DIDN'T HAVE MONEY TO GET MORE.: PATIENT DECLINED

## 2024-02-20 SDOH — ECONOMIC STABILITY: INCOME INSECURITY: HOW HARD IS IT FOR YOU TO PAY FOR THE VERY BASICS LIKE FOOD, HOUSING, MEDICAL CARE, AND HEATING?: PATIENT DECLINED

## 2024-02-20 SDOH — ECONOMIC STABILITY: FOOD INSECURITY: WITHIN THE PAST 12 MONTHS, YOU WORRIED THAT YOUR FOOD WOULD RUN OUT BEFORE YOU GOT MONEY TO BUY MORE.: PATIENT DECLINED

## 2024-02-20 ASSESSMENT — PATIENT HEALTH QUESTIONNAIRE - PHQ9
SUM OF ALL RESPONSES TO PHQ9 QUESTIONS 1 & 2: 0
SUM OF ALL RESPONSES TO PHQ QUESTIONS 1-9: 0
SUM OF ALL RESPONSES TO PHQ QUESTIONS 1-9: 0
2. FEELING DOWN, DEPRESSED OR HOPELESS: 0
SUM OF ALL RESPONSES TO PHQ QUESTIONS 1-9: 0
SUM OF ALL RESPONSES TO PHQ QUESTIONS 1-9: 0
1. LITTLE INTEREST OR PLEASURE IN DOING THINGS: 0

## 2024-02-20 ASSESSMENT — ENCOUNTER SYMPTOMS
VOMITING: 0
SORE THROAT: 0
COUGH: 1

## 2024-02-20 NOTE — PROGRESS NOTES
Florina Carranza, was evaluated through a synchronous (real-time) audio-video encounter. The patient (or guardian if applicable) is aware that this is a billable service, which includes applicable co-pays. This Virtual Visit was conducted with patient's (and/or legal guardian's) consent. Patient identification was verified, and a caregiver was present when appropriate.   The patient was located at Home: [address] [place] [address]  Provider was located at Facility (Appt Dept): [address] [place] [address]      Florina Carranza (:  1990) is a Established patient, presenting virtually for evaluation of the following:    Assessment & Plan   Below is the assessment and plan developed based on review of pertinent history, physical exam, labs, studies, and medications.  1. COVID  No acute distress noted, is speaking in full and complete sentences, no use of  muscles.  States that she has had intermittent shortness of breath, will prescribe albuterol inhaler to use as needed.  Have also prescribed Tessalon Perles to use as needed for cough.  Advised continued use of Mucinex. Directions for use and side effects discussed.      Instructed to self quarantine at home for at least 5 days since symptoms first appeared and meets the following criteria:   1. Symptoms are improving  2. No fever x 24 hours, without the use of fever reducing medications.   3. Wear mask through day 10.    She should isolate from household contacts if possible, and mask wearing encouraged for patient and contacts. She may treat fever and body aches with tylenol/ibuprofen, ensure adequate rest and hydration.  Monitor oxygen levels with home pulse oximeter, blood oxygen saturation monitor.  Levels should be greater than 90. Go to the ED if shortness of breath develops or symptoms significantly worsen.  Follow-up with PCP if no improvement in the next week.        Subjective   Ms. Carranza is a pleasant

## 2024-04-30 ENCOUNTER — TELEPHONE (OUTPATIENT)
Dept: FAMILY MEDICINE CLINIC | Facility: CLINIC | Age: 34
End: 2024-04-30

## 2024-04-30 ENCOUNTER — OFFICE VISIT (OUTPATIENT)
Dept: FAMILY MEDICINE CLINIC | Facility: CLINIC | Age: 34
End: 2024-04-30

## 2024-04-30 VITALS
HEART RATE: 99 BPM | SYSTOLIC BLOOD PRESSURE: 124 MMHG | HEIGHT: 64 IN | OXYGEN SATURATION: 97 % | WEIGHT: 227 LBS | DIASTOLIC BLOOD PRESSURE: 76 MMHG | TEMPERATURE: 98.6 F | BODY MASS INDEX: 38.76 KG/M2

## 2024-04-30 DIAGNOSIS — C54.1 MALIGNANT NEOPLASM OF ENDOMETRIUM (HCC): ICD-10-CM

## 2024-04-30 DIAGNOSIS — Z00.00 ROUTINE GENERAL MEDICAL EXAMINATION AT A HEALTH CARE FACILITY: Primary | ICD-10-CM

## 2024-04-30 DIAGNOSIS — I10 ESSENTIAL HYPERTENSION: ICD-10-CM

## 2024-04-30 DIAGNOSIS — Z00.00 ROUTINE GENERAL MEDICAL EXAMINATION AT A HEALTH CARE FACILITY: ICD-10-CM

## 2024-04-30 DIAGNOSIS — E66.09 CLASS 2 OBESITY DUE TO EXCESS CALORIES WITHOUT SERIOUS COMORBIDITY WITH BODY MASS INDEX (BMI) OF 38.0 TO 38.9 IN ADULT: ICD-10-CM

## 2024-04-30 DIAGNOSIS — F90.0 ADHD, PREDOMINANTLY INATTENTIVE TYPE: ICD-10-CM

## 2024-04-30 LAB
ALBUMIN SERPL-MCNC: 4 G/DL (ref 3.5–5)
ALBUMIN/GLOB SERPL: 1.2 (ref 1–1.9)
ALP SERPL-CCNC: 58 U/L (ref 35–104)
ALT SERPL-CCNC: 27 U/L (ref 12–65)
AMPHET UR QL SCN: POSITIVE
ANION GAP SERPL CALC-SCNC: 12 MMOL/L (ref 9–18)
AST SERPL-CCNC: 23 U/L (ref 15–37)
BARBITURATES UR QL SCN: NEGATIVE
BASOPHILS # BLD: 0 K/UL (ref 0–0.2)
BASOPHILS NFR BLD: 0 % (ref 0–2)
BENZODIAZ UR QL: NEGATIVE
BILIRUB SERPL-MCNC: 0.3 MG/DL (ref 0–1.2)
BUN SERPL-MCNC: 10 MG/DL (ref 6–23)
CALCIUM SERPL-MCNC: 9.8 MG/DL (ref 8.8–10.2)
CANNABINOIDS UR QL SCN: NEGATIVE
CHLORIDE SERPL-SCNC: 102 MMOL/L (ref 98–107)
CHOLEST SERPL-MCNC: 173 MG/DL (ref 0–200)
CO2 SERPL-SCNC: 24 MMOL/L (ref 20–28)
COCAINE UR QL SCN: NEGATIVE
CREAT SERPL-MCNC: 0.73 MG/DL (ref 0.6–1.1)
DIFFERENTIAL METHOD BLD: NORMAL
EOSINOPHIL # BLD: 0.1 K/UL (ref 0–0.8)
EOSINOPHIL NFR BLD: 1 % (ref 0.5–7.8)
ERYTHROCYTE [DISTWIDTH] IN BLOOD BY AUTOMATED COUNT: 12.6 % (ref 11.9–14.6)
GLOBULIN SER CALC-MCNC: 3.4 G/DL (ref 2.3–3.5)
GLUCOSE SERPL-MCNC: 89 MG/DL (ref 70–99)
HCT VFR BLD AUTO: 41.7 % (ref 35.8–46.3)
HDLC SERPL-MCNC: 43 MG/DL (ref 40–60)
HDLC SERPL: 4.1 (ref 0–5)
HGB BLD-MCNC: 13.9 G/DL (ref 11.7–15.4)
IMM GRANULOCYTES # BLD AUTO: 0 K/UL (ref 0–0.5)
IMM GRANULOCYTES NFR BLD AUTO: 0 % (ref 0–5)
LDLC SERPL CALC-MCNC: 105 MG/DL (ref 0–100)
LYMPHOCYTES # BLD: 3.5 K/UL (ref 0.5–4.6)
LYMPHOCYTES NFR BLD: 34 % (ref 13–44)
MCH RBC QN AUTO: 29.3 PG (ref 26.1–32.9)
MCHC RBC AUTO-ENTMCNC: 33.3 G/DL (ref 31.4–35)
MCV RBC AUTO: 87.8 FL (ref 82–102)
METHADONE UR QL: NEGATIVE
MONOCYTES # BLD: 0.7 K/UL (ref 0.1–1.3)
MONOCYTES NFR BLD: 7 % (ref 4–12)
NEUTS SEG # BLD: 5.9 K/UL (ref 1.7–8.2)
NEUTS SEG NFR BLD: 58 % (ref 43–78)
NRBC # BLD: 0 K/UL (ref 0–0.2)
OPIATES UR QL: NEGATIVE
PCP UR QL: NEGATIVE
PLATELET # BLD AUTO: 280 K/UL (ref 150–450)
PMV BLD AUTO: 11 FL (ref 9.4–12.3)
POTASSIUM SERPL-SCNC: 4.2 MMOL/L (ref 3.5–5.1)
PROT SERPL-MCNC: 7.4 G/DL (ref 6.3–8.2)
RBC # BLD AUTO: 4.75 M/UL (ref 4.05–5.2)
SODIUM SERPL-SCNC: 138 MMOL/L (ref 136–145)
TRIGL SERPL-MCNC: 130 MG/DL (ref 0–150)
VLDLC SERPL CALC-MCNC: 26 MG/DL (ref 6–23)
WBC # BLD AUTO: 10.3 K/UL (ref 4.3–11.1)

## 2024-04-30 PROCEDURE — 99395 PREV VISIT EST AGE 18-39: CPT | Performed by: FAMILY MEDICINE

## 2024-04-30 PROCEDURE — 3078F DIAST BP <80 MM HG: CPT | Performed by: FAMILY MEDICINE

## 2024-04-30 PROCEDURE — 3074F SYST BP LT 130 MM HG: CPT | Performed by: FAMILY MEDICINE

## 2024-04-30 RX ORDER — DEXTROAMPHETAMINE SACCHARATE, AMPHETAMINE ASPARTATE, DEXTROAMPHETAMINE SULFATE AND AMPHETAMINE SULFATE 7.5; 7.5; 7.5; 7.5 MG/1; MG/1; MG/1; MG/1
30 TABLET ORAL DAILY
Qty: 60 TABLET | Refills: 0 | Status: SHIPPED | OUTPATIENT
Start: 2024-05-30 | End: 2024-06-29

## 2024-04-30 RX ORDER — DEXTROAMPHETAMINE SACCHARATE, AMPHETAMINE ASPARTATE, DEXTROAMPHETAMINE SULFATE AND AMPHETAMINE SULFATE 7.5; 7.5; 7.5; 7.5 MG/1; MG/1; MG/1; MG/1
30 TABLET ORAL DAILY
Qty: 60 TABLET | Refills: 0 | Status: SHIPPED | OUTPATIENT
Start: 2024-06-29 | End: 2024-07-29

## 2024-04-30 RX ORDER — DEXTROAMPHETAMINE SACCHARATE, AMPHETAMINE ASPARTATE, DEXTROAMPHETAMINE SULFATE AND AMPHETAMINE SULFATE 7.5; 7.5; 7.5; 7.5 MG/1; MG/1; MG/1; MG/1
30 TABLET ORAL DAILY
Qty: 60 TABLET | Refills: 0 | Status: SHIPPED | OUTPATIENT
Start: 2024-04-30 | End: 2024-05-30

## 2024-04-30 ASSESSMENT — ENCOUNTER SYMPTOMS
CONSTIPATION: 0
ABDOMINAL PAIN: 0
DIARRHEA: 0
WHEEZING: 0
SHORTNESS OF BREATH: 0
RESPIRATORY NEGATIVE: 1
COUGH: 0

## 2024-04-30 NOTE — TELEPHONE ENCOUNTER
Per patient she states that there is an issue with the instructions on her medication, please call the pharmacy.

## 2024-04-30 NOTE — PROGRESS NOTES
[facility]  [address] [place] [address]  Phone [phone]  Fax:  [phone]    Patient: Florina Carranza  YOB: 1990  Patient Age 34 y.o.  Patient sex: female  Medical Record:  [id number]  Visit Date: 04/30/24    [facility] Clinic Note     Chief Complaint   Patient presents with    Follow-up       History of Present Illness:  32 yo female who presents for follow up visit.     Adult ADHD: Dx'd - in college - Was seeing a Psychologist. Was on an antidepressant and stopped. Denies depression.    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   On adderall 30 mg - taking daily - No vacations.  May not take it some wkends. Sleep is good. Wt is good. No palpitations.   Says med  helps a lot.  Hard time functioning and focusing and more anxious when off the med.   Has been on it since college.        Adderall 30mg po daily in am, 1/2 tablet at noon and 1/2 tablet in pm.  Pt is given 3 months of medication refills.  Pt reports medication is working as expected with no particular concerns.  Continue current therapy.       Dx's with endometrial CA in 12/2022 -  F/u'd with Oncology - No f/u now.    Robotic hysterectomy, bilateral salpingectomies on 12/23/22. She is here today for her post op visit.  Path :A.  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     Pt's BP medication lisinopril 5mg po daily - she stopped it. BP is good today.      Tobacco abuse - quit last yr. Quit date Oct 22/2023 . Had been smoking x yrs. <1ppd.  Just went cold turkey.     Obesity - Sits a lot during the day. Works from home. Does a lot of yard work.     One daughter - to be age 16 this weekend. Dad is not in the picture. Does have family around.   Was

## 2024-05-10 DIAGNOSIS — M77.8 TENDONITIS OF ELBOW, RIGHT: ICD-10-CM

## 2024-05-10 DIAGNOSIS — F90.0 ADHD, PREDOMINANTLY INATTENTIVE TYPE: ICD-10-CM

## 2024-05-10 DIAGNOSIS — F90.0 ADHD, PREDOMINANTLY INATTENTIVE TYPE: Primary | ICD-10-CM

## 2024-05-10 RX ORDER — DEXTROAMPHETAMINE SACCHARATE, AMPHETAMINE ASPARTATE, DEXTROAMPHETAMINE SULFATE AND AMPHETAMINE SULFATE 7.5; 7.5; 7.5; 7.5 MG/1; MG/1; MG/1; MG/1
TABLET ORAL
Qty: 60 TABLET | Refills: 0 | Status: SHIPPED | OUTPATIENT
Start: 2024-05-10 | End: 2024-06-09

## 2024-05-10 RX ORDER — DEXTROAMPHETAMINE SACCHARATE, AMPHETAMINE ASPARTATE, DEXTROAMPHETAMINE SULFATE AND AMPHETAMINE SULFATE 7.5; 7.5; 7.5; 7.5 MG/1; MG/1; MG/1; MG/1
TABLET ORAL
Qty: 60 TABLET | Refills: 0 | Status: SHIPPED | OUTPATIENT
Start: 2024-06-09 | End: 2024-07-09

## 2024-05-10 RX ORDER — DEXTROAMPHETAMINE SACCHARATE, AMPHETAMINE ASPARTATE, DEXTROAMPHETAMINE SULFATE AND AMPHETAMINE SULFATE 7.5; 7.5; 7.5; 7.5 MG/1; MG/1; MG/1; MG/1
TABLET ORAL
Qty: 60 TABLET | Refills: 0 | Status: SHIPPED | OUTPATIENT
Start: 2024-07-09 | End: 2024-08-08

## 2024-05-10 RX ORDER — DEXTROAMPHETAMINE SACCHARATE, AMPHETAMINE ASPARTATE, DEXTROAMPHETAMINE SULFATE AND AMPHETAMINE SULFATE 7.5; 7.5; 7.5; 7.5 MG/1; MG/1; MG/1; MG/1
30 TABLET ORAL 2 TIMES DAILY
Qty: 60 TABLET | Refills: 3 | Status: CANCELLED | OUTPATIENT
Start: 2024-05-10 | End: 2024-09-07

## 2024-05-28 NOTE — PROGRESS NOTES
[facility]red[facility] Cancer Clinic - Initial Evaluation   Patient: Florina Carranza   YOB: 1990      Location: [facility] HEMATOLOGY AND ONCOLOGY - Provider participated in today's evaluation via telemedicine in [place], [place]. Patient completed today's evaluation from [place].   Date of Evaluation: 6/20/2024      Referral Source: Meaghan Pena MD   Referral Reason: C54.1 (ICD-10-CM) - Malignant neoplasm of endometrium (HCC)       Genetic Counselor: Patricia Dunbar MS, [facility]  Clyde Lorenzo, Genetic Counseling Intern      Florina Carranza was referred for evaluation for the potential of hereditary cancer due to her personal diagnosis. Florina has consented to a visit via telephone.   Personalized risk assessment was performed and genetic testing options were reviewed.  For full details, please see Risk Assessment and Discussion in this document.  Following genetic counseling, Florina's action plan is:    PURSUES TESTING: CancerNext-Expanded+RNAinsight panel of 71 genes associated with hereditary cancer (including BRCA1 and BRCA2) was offered and accepted.  Test results should be available in approximately 3-4 weeks.     Relevant Personal Medical History   Florina is a 34-year-old female who was diagnosed with endometrial intraepithelial neoplasia at age 32. Treatment included robotic hysterectomy with bilateral salpingectomies.     Hormonal history:  Age of Menarche: 10  Age of First Live Birth: 18  Age of Menopause: Denies  Hormonal Replacement Therapy: Denies    Screening history:  Last mammogram:  Denies  History of breast biopsy: N/A  Last pap smear: 11/22  Latest colonoscopy:  Denies  History of polyps:  N/A  Last dermatological exam: Been a long time, saw them in the past due to acne. Last seen in 9th grade.     Social history:  Tobacco use: Formerly  Alcohol use: Socially    General medical history:  Past Medical History:   Diagnosis Date    ADHD     Anxiety     Headache     Hypertension        Surgical

## 2024-06-20 ENCOUNTER — TELEMEDICINE (OUTPATIENT)
Dept: ONCOLOGY | Age: 34
End: 2024-06-20

## 2024-06-20 DIAGNOSIS — Z80.3 FAMILY HISTORY OF MALIGNANT NEOPLASM OF BREAST: Primary | ICD-10-CM

## 2024-07-17 NOTE — TELEPHONE ENCOUNTER
Pt called in stating that she needs the adderall sent to     [facility] in [place] Detail Level: Simple Additional Notes: Patient was seen  today and expressed she thought she was having the full body I did explain the difference on the appointments and she wanted to proceed with the area of concern. since this is the decision she made on her own I did reinterpret to her she could follow up to the area of concern treated today and do a full body later this year as she does have a history of skin cancer and does need to be seen 2x a year. Render Risk Assessment In Note?: no

## 2024-08-12 ENCOUNTER — TELEMEDICINE (OUTPATIENT)
Dept: FAMILY MEDICINE CLINIC | Facility: CLINIC | Age: 34
End: 2024-08-12

## 2024-08-12 DIAGNOSIS — F90.0 ADHD, PREDOMINANTLY INATTENTIVE TYPE: Primary | ICD-10-CM

## 2024-08-12 PROCEDURE — 99442 PR PHYS/QHP TELEPHONE EVALUATION 11-20 MIN: CPT | Performed by: FAMILY MEDICINE

## 2024-08-12 RX ORDER — DEXTROAMPHETAMINE SACCHARATE, AMPHETAMINE ASPARTATE, DEXTROAMPHETAMINE SULFATE AND AMPHETAMINE SULFATE 7.5; 7.5; 7.5; 7.5 MG/1; MG/1; MG/1; MG/1
TABLET ORAL
Qty: 60 TABLET | Refills: 0 | Status: SHIPPED | OUTPATIENT
Start: 2024-10-09 | End: 2024-11-06

## 2024-08-12 RX ORDER — DEXTROAMPHETAMINE SACCHARATE, AMPHETAMINE ASPARTATE, DEXTROAMPHETAMINE SULFATE AND AMPHETAMINE SULFATE 7.5; 7.5; 7.5; 7.5 MG/1; MG/1; MG/1; MG/1
TABLET ORAL
Qty: 60 TABLET | Refills: 0 | Status: SHIPPED | OUTPATIENT
Start: 2024-09-11 | End: 2024-10-09

## 2024-08-12 RX ORDER — DEXTROAMPHETAMINE SACCHARATE, AMPHETAMINE ASPARTATE, DEXTROAMPHETAMINE SULFATE AND AMPHETAMINE SULFATE 7.5; 7.5; 7.5; 7.5 MG/1; MG/1; MG/1; MG/1
TABLET ORAL
Qty: 60 TABLET | Refills: 0 | Status: SHIPPED | OUTPATIENT
Start: 2024-08-12 | End: 2024-09-11

## 2024-08-12 RX ORDER — DEXTROAMPHETAMINE SACCHARATE, AMPHETAMINE ASPARTATE, DEXTROAMPHETAMINE SULFATE AND AMPHETAMINE SULFATE 7.5; 7.5; 7.5; 7.5 MG/1; MG/1; MG/1; MG/1
TABLET ORAL
Qty: 60 TABLET | Refills: 0 | Status: CANCELLED | OUTPATIENT
Start: 2024-08-12 | End: 2024-09-11

## 2024-08-12 NOTE — ASSESSMENT & PLAN NOTE
Well-controlled, continue current medications  She does occasionally skip a wkend, but overall is stable on current dosing.     Orders:    amphetamine-dextroamphetamine (ADDERALL) 30 MG tablet; Take 1 tab in Am and 1/2 tab at Noon then 1/2 tab at 4pm.    amphetamine-dextroamphetamine (ADDERALL) 30 MG tablet; Take 1 tab in Am then 1/2 tab at noon and 1/2 tab at 4pm    amphetamine-dextroamphetamine (ADDERALL) 30 MG tablet; Take 1 tab in Am then 1/2 tab at noon then 1/2 tab at 4pm

## 2024-08-12 NOTE — PROGRESS NOTES
Florina Carranza, was evaluated through a synchronous (real-time) audio-video encounter. The patient (or guardian if applicable) is aware that this is a billable service, which includes applicable co-pays. This Virtual Visit was conducted with patient's (and/or legal guardian's) consent. Patient identification was verified, and a caregiver was present when appropriate.   The patient was located at Other: work  Provider was located at Facility (Appt Dept): [id number] [place] [address]  Confirm you are appropriately licensed, registered, or certified to deliver care in the state where the patient is located as indicated above. If you are not or unsure, please re-schedule the visit: Yes, I confirm.     Florina Carranza (:  1990) is a Established patient, presenting virtually for evaluation of the following:      Below is the assessment and plan developed based on review of pertinent history, physical exam, labs, studies, and medications.     Assessment & Plan  ADHD, predominantly inattentive type   Well-controlled, continue current medications  She does occasionally skip a wkend, but overall is stable on current dosing.     Orders:    amphetamine-dextroamphetamine (ADDERALL) 30 MG tablet; Take 1 tab in Am and 1/2 tab at Noon then 1/2 tab at 4pm.    amphetamine-dextroamphetamine (ADDERALL) 30 MG tablet; Take 1 tab in Am then 1/2 tab at noon and 1/2 tab at 4pm    amphetamine-dextroamphetamine (ADDERALL) 30 MG tablet; Take 1 tab in Am then 1/2 tab at noon then 1/2 tab at 4pm      Return in 3 months (on 2024).       Subjective   35 yo female who presents for follow up visit for adhd meds.      Adult ADHD: Dx'd - in college - Was seeing a Psychologist. Was on an antidepressant and stopped. Denies depression.    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   On adderall 30 mg in am and 1/2 tab bid -taking daily -  May not

## 2024-11-07 ENCOUNTER — TELEMEDICINE (OUTPATIENT)
Dept: FAMILY MEDICINE CLINIC | Facility: CLINIC | Age: 34
End: 2024-11-07
Payer: MEDICAID

## 2024-11-07 DIAGNOSIS — F90.0 ADHD, PREDOMINANTLY INATTENTIVE TYPE: ICD-10-CM

## 2024-11-07 PROCEDURE — 99213 OFFICE O/P EST LOW 20 MIN: CPT | Performed by: FAMILY MEDICINE

## 2024-11-07 RX ORDER — DEXTROAMPHETAMINE SACCHARATE, AMPHETAMINE ASPARTATE, DEXTROAMPHETAMINE SULFATE AND AMPHETAMINE SULFATE 7.5; 7.5; 7.5; 7.5 MG/1; MG/1; MG/1; MG/1
30 TABLET ORAL DAILY
Qty: 30 TABLET | Refills: 0 | OUTPATIENT
Start: 2024-12-07 | End: 2025-01-06

## 2024-11-07 RX ORDER — DEXTROAMPHETAMINE SACCHARATE, AMPHETAMINE ASPARTATE, DEXTROAMPHETAMINE SULFATE AND AMPHETAMINE SULFATE 7.5; 7.5; 7.5; 7.5 MG/1; MG/1; MG/1; MG/1
30 TABLET ORAL DAILY
Qty: 30 TABLET | Refills: 0 | OUTPATIENT
Start: 2024-11-07 | End: 2024-12-07

## 2024-11-07 RX ORDER — DEXTROAMPHETAMINE SACCHARATE, AMPHETAMINE ASPARTATE, DEXTROAMPHETAMINE SULFATE AND AMPHETAMINE SULFATE 7.5; 7.5; 7.5; 7.5 MG/1; MG/1; MG/1; MG/1
30 TABLET ORAL DAILY
Qty: 30 TABLET | Refills: 0 | OUTPATIENT
Start: 2025-01-06 | End: 2025-02-05

## 2024-11-07 ASSESSMENT — ENCOUNTER SYMPTOMS
ABDOMINAL PAIN: 0
DIARRHEA: 0

## 2024-11-07 NOTE — PROGRESS NOTES
item  -- DELETE ALL ITEMS NOT EXAMINED]    Constitutional: [x] Appears well-developed and well-nourished [x] No apparent distress      [] Abnormal -     Mental status: [x] Alert and awake  [x] Oriented to person/place/time [x] Able to follow commands    [] Abnormal -     Eyes:   EOM    [x]  Normal    [] Abnormal -   Sclera  [x]  Normal    [] Abnormal -          Discharge [x]  None visible   [] Abnormal -     HENT: [x] Normocephalic, atraumatic  [] Abnormal -   [] Mouth/Throat: Mucous membranes are moist    External Ears [] Normal  [] Abnormal -    Neck: [x] No visualized mass [] Abnormal -     Pulmonary/Chest: [x] Respiratory effort normal   [x] No visualized signs of difficulty breathing or respiratory distress        [] Abnormal -      Musculoskeletal:   [] Normal gait with no signs of ataxia         [x] Normal range of motion of neck        [] Abnormal -     Neurological:        [x] No Facial Asymmetry (Cranial nerve 7 motor function) (limited exam due to video visit)          [] No gaze palsy        [] Abnormal -          Skin:        [] No significant exanthematous lesions or discoloration noted on facial skin         [] Abnormal -            Psychiatric:       [x] Normal Affect [] Abnormal -        [] No Hallucinations    Other pertinent observable physical exam findings:-             --Meaghan Pena MD

## 2024-11-09 ENCOUNTER — TELEPHONE (OUTPATIENT)
Dept: PRIMARY CARE CLINIC | Facility: CLINIC | Age: 34
End: 2024-11-09

## 2024-11-09 DIAGNOSIS — F90.0 ADHD, PREDOMINANTLY INATTENTIVE TYPE: ICD-10-CM

## 2024-11-09 RX ORDER — DEXTROAMPHETAMINE SACCHARATE, AMPHETAMINE ASPARTATE, DEXTROAMPHETAMINE SULFATE AND AMPHETAMINE SULFATE 7.5; 7.5; 7.5; 7.5 MG/1; MG/1; MG/1; MG/1
TABLET ORAL
Qty: 60 TABLET | Refills: 0 | Status: SHIPPED | OUTPATIENT
Start: 2025-01-06 | End: 2025-02-10

## 2024-11-09 RX ORDER — DEXTROAMPHETAMINE SACCHARATE, AMPHETAMINE ASPARTATE, DEXTROAMPHETAMINE SULFATE AND AMPHETAMINE SULFATE 7.5; 7.5; 7.5; 7.5 MG/1; MG/1; MG/1; MG/1
TABLET ORAL
Qty: 60 TABLET | Refills: 0 | Status: SHIPPED | OUTPATIENT
Start: 2024-11-09 | End: 2024-12-07

## 2024-11-09 RX ORDER — DEXTROAMPHETAMINE SACCHARATE, AMPHETAMINE ASPARTATE, DEXTROAMPHETAMINE SULFATE AND AMPHETAMINE SULFATE 7.5; 7.5; 7.5; 7.5 MG/1; MG/1; MG/1; MG/1
TABLET ORAL
Qty: 14 TABLET | Refills: 0 | Status: SHIPPED | OUTPATIENT
Start: 2024-11-09 | End: 2024-12-07

## 2024-11-09 RX ORDER — DEXTROAMPHETAMINE SACCHARATE, AMPHETAMINE ASPARTATE, DEXTROAMPHETAMINE SULFATE AND AMPHETAMINE SULFATE 7.5; 7.5; 7.5; 7.5 MG/1; MG/1; MG/1; MG/1
TABLET ORAL
Qty: 60 TABLET | Refills: 0 | Status: SHIPPED | OUTPATIENT
Start: 2024-12-07 | End: 2025-01-05

## 2024-11-09 NOTE — TELEPHONE ENCOUNTER
Received on-call message r/t Med Refill for Adderall 30 mg #60. Patient reports that she had an appt on 11/07/2024 and the medication was not sent to [facility]. She is requesting 3 month supply to be sent to her preferred pharmacy.    She reports that she is going out of town today and needs the medication for work. I will send in 7 day supply until PCP sends in additional Rx.    Received message at 9:33 AM.  Contacted patient at 9:38 AM.

## 2024-11-09 NOTE — ASSESSMENT & PLAN NOTE
Stable on current meds. Renewed for 3 mos.  Meds working well w/o wt loss or palpitations or htn.  PDMP reviewed and approp.      Orders:    amphetamine-dextroamphetamine (ADDERALL) 30 MG tablet; One tablet po AM and 1/2 tablet po at noon, 1/2 tablet po in pm    amphetamine-dextroamphetamine (ADDERALL) 30 MG tablet; Take 1 tab Am and 1/2 tab at noon and 1/2 tab 4 pm.    amphetamine-dextroamphetamine (ADDERALL) 30 MG tablet; 30 mg in AM, then 1/2 tab at noon and 1/2 tab at 4 pm

## 2024-11-11 ENCOUNTER — TELEPHONE (OUTPATIENT)
Dept: FAMILY MEDICINE CLINIC | Facility: CLINIC | Age: 34
End: 2024-11-11

## 2024-11-11 DIAGNOSIS — F90.0 ADHD, PREDOMINANTLY INATTENTIVE TYPE: ICD-10-CM

## 2024-11-11 RX ORDER — DEXTROAMPHETAMINE SACCHARATE, AMPHETAMINE ASPARTATE, DEXTROAMPHETAMINE SULFATE AND AMPHETAMINE SULFATE 7.5; 7.5; 7.5; 7.5 MG/1; MG/1; MG/1; MG/1
TABLET ORAL
Qty: 14 TABLET | Refills: 0 | Status: CANCELLED | OUTPATIENT
Start: 2024-11-11 | End: 2024-12-09

## 2024-11-11 NOTE — TELEPHONE ENCOUNTER
Please call her it's about Adderall see note from weekend and the [facility] is closed for vet. Day she called [facility] [address] it's open and needs it soon leaving for work trip

## 2025-02-14 ENCOUNTER — TELEMEDICINE (OUTPATIENT)
Dept: FAMILY MEDICINE CLINIC | Facility: CLINIC | Age: 35
End: 2025-02-14

## 2025-02-14 DIAGNOSIS — I10 ESSENTIAL HYPERTENSION: ICD-10-CM

## 2025-02-14 DIAGNOSIS — F90.0 ADHD, PREDOMINANTLY INATTENTIVE TYPE: Primary | ICD-10-CM

## 2025-02-14 PROCEDURE — 99213 OFFICE O/P EST LOW 20 MIN: CPT | Performed by: FAMILY MEDICINE

## 2025-02-14 RX ORDER — DEXTROAMPHETAMINE SACCHARATE, AMPHETAMINE ASPARTATE, DEXTROAMPHETAMINE SULFATE AND AMPHETAMINE SULFATE 7.5; 7.5; 7.5; 7.5 MG/1; MG/1; MG/1; MG/1
TABLET ORAL
Qty: 60 TABLET | Refills: 0 | Status: SHIPPED | OUTPATIENT
Start: 2025-04-13 | End: 2025-05-11

## 2025-02-14 RX ORDER — DEXTROAMPHETAMINE SACCHARATE, AMPHETAMINE ASPARTATE, DEXTROAMPHETAMINE SULFATE AND AMPHETAMINE SULFATE 7.5; 7.5; 7.5; 7.5 MG/1; MG/1; MG/1; MG/1
TABLET ORAL
Qty: 60 TABLET | Refills: 0 | Status: SHIPPED | OUTPATIENT
Start: 2025-03-16 | End: 2025-04-13

## 2025-02-14 RX ORDER — DEXTROAMPHETAMINE SACCHARATE, AMPHETAMINE ASPARTATE, DEXTROAMPHETAMINE SULFATE AND AMPHETAMINE SULFATE 7.5; 7.5; 7.5; 7.5 MG/1; MG/1; MG/1; MG/1
TABLET ORAL
Qty: 60 TABLET | Refills: 0 | Status: SHIPPED | OUTPATIENT
Start: 2025-02-14 | End: 2025-03-16

## 2025-02-14 SDOH — ECONOMIC STABILITY: FOOD INSECURITY: WITHIN THE PAST 12 MONTHS, YOU WORRIED THAT YOUR FOOD WOULD RUN OUT BEFORE YOU GOT MONEY TO BUY MORE.: NEVER TRUE

## 2025-02-14 SDOH — ECONOMIC STABILITY: FOOD INSECURITY: WITHIN THE PAST 12 MONTHS, THE FOOD YOU BOUGHT JUST DIDN'T LAST AND YOU DIDN'T HAVE MONEY TO GET MORE.: NEVER TRUE

## 2025-02-14 ASSESSMENT — PATIENT HEALTH QUESTIONNAIRE - PHQ9
1. LITTLE INTEREST OR PLEASURE IN DOING THINGS: NOT AT ALL
SUM OF ALL RESPONSES TO PHQ QUESTIONS 1-9: 0
SUM OF ALL RESPONSES TO PHQ9 QUESTIONS 1 & 2: 0
2. FEELING DOWN, DEPRESSED OR HOPELESS: NOT AT ALL
SUM OF ALL RESPONSES TO PHQ QUESTIONS 1-9: 0

## 2025-02-14 ASSESSMENT — ENCOUNTER SYMPTOMS: DIARRHEA: 0

## 2025-02-14 NOTE — ASSESSMENT & PLAN NOTE
Doing well on current dose of Adderall.  SHe denies any sleep or blood pressure issues.  No weight loss.  Continue current dose.  Last UDS appropriate in April 2024.  PDMP was checked today.  Loss continues    Orders:    amphetamine-dextroamphetamine (ADDERALL) 30 MG tablet; Take 1 tab in Am and 1/2 tab at Noon then 1/2 tab at 4pm.    amphetamine-dextroamphetamine (ADDERALL) 30 MG tablet; Take 1 tab in Am then 1/2 tab at noon and 1/2 tab at 4pm    amphetamine-dextroamphetamine (ADDERALL) 30 MG tablet; One tablet po AM and 1/2 tablet po at noon, 1/2 tablet po in pm

## 2025-02-14 NOTE — PROGRESS NOTES
Florina Carranza, was evaluated through a synchronous (real-time) audio-video encounter. The patient (or guardian if applicable) is aware that this is a billable service, which includes applicable co-pays. This Virtual Visit was conducted with patient's (and/or legal guardian's) consent. Patient identification was verified, and a caregiver was present when appropriate.   The patient was located at Home: [address] [place] [address]  Provider was located at Facility (Appt Dept): [address] [place] [address]  Confirm you are appropriately licensed, registered, or certified to deliver care in the state where the patient is located as indicated above. If you are not or unsure, please re-schedule the visit: Yes, I confirm.     Florina Carranza (:  1990) is a Established patient, presenting virtually for evaluation of the following:      Below is the assessment and plan developed based on review of pertinent history, physical exam, labs, studies, and medications.     Assessment & Plan  ADHD, predominantly inattentive type  Doing well on current dose of Adderall.  SHe denies any sleep or blood pressure issues.  No weight loss.  Continue current dose.  Last UDS appropriate in 2024.  PDMP was checked today.  Loss continues    Orders:    amphetamine-dextroamphetamine (ADDERALL) 30 MG tablet; Take 1 tab in Am and 1/2 tab at Noon then 1/2 tab at 4pm.    amphetamine-dextroamphetamine (ADDERALL) 30 MG tablet; Take 1 tab in Am then 1/2 tab at noon and 1/2 tab at 4pm    amphetamine-dextroamphetamine (ADDERALL) 30 MG tablet; One tablet po AM and 1/2 tablet po at noon, 1/2 tablet po in pm    Essential hypertension    Blood pressure at goal and she has stopped lisinopril now.  Continue to monitor at home and discuss Adderall can increase her blood pressure.  Goal is less than 130/80.           No follow-ups on file.       Subjective   33 yo female who presents for follow up visit for

## 2025-03-15 DIAGNOSIS — F90.0 ADHD, PREDOMINANTLY INATTENTIVE TYPE: ICD-10-CM

## 2025-03-15 RX ORDER — DEXTROAMPHETAMINE SACCHARATE, AMPHETAMINE ASPARTATE, DEXTROAMPHETAMINE SULFATE AND AMPHETAMINE SULFATE 7.5; 7.5; 7.5; 7.5 MG/1; MG/1; MG/1; MG/1
TABLET ORAL
Qty: 60 TABLET | Refills: 0 | Status: SHIPPED | OUTPATIENT
Start: 2025-03-15 | End: 2025-04-14

## 2025-05-14 ENCOUNTER — OFFICE VISIT (OUTPATIENT)
Dept: FAMILY MEDICINE CLINIC | Facility: CLINIC | Age: 35
End: 2025-05-14

## 2025-05-14 VITALS
DIASTOLIC BLOOD PRESSURE: 86 MMHG | OXYGEN SATURATION: 97 % | SYSTOLIC BLOOD PRESSURE: 122 MMHG | BODY MASS INDEX: 40.63 KG/M2 | WEIGHT: 238 LBS | TEMPERATURE: 97.6 F | HEART RATE: 106 BPM | HEIGHT: 64 IN

## 2025-05-14 DIAGNOSIS — E66.812 CLASS 2 OBESITY DUE TO EXCESS CALORIES WITHOUT SERIOUS COMORBIDITY WITH BODY MASS INDEX (BMI) OF 38.0 TO 38.9 IN ADULT: ICD-10-CM

## 2025-05-14 DIAGNOSIS — F90.0 ADHD, PREDOMINANTLY INATTENTIVE TYPE: ICD-10-CM

## 2025-05-14 DIAGNOSIS — E66.09 CLASS 2 OBESITY DUE TO EXCESS CALORIES WITHOUT SERIOUS COMORBIDITY WITH BODY MASS INDEX (BMI) OF 38.0 TO 38.9 IN ADULT: ICD-10-CM

## 2025-05-14 DIAGNOSIS — F90.0 ADHD, PREDOMINANTLY INATTENTIVE TYPE: Primary | ICD-10-CM

## 2025-05-14 DIAGNOSIS — Z85.42 HISTORY OF ENDOMETRIAL CANCER: ICD-10-CM

## 2025-05-14 DIAGNOSIS — I10 ESSENTIAL HYPERTENSION: ICD-10-CM

## 2025-05-14 LAB
AMPHET UR QL SCN: POSITIVE
BARBITURATES UR QL SCN: NEGATIVE
BENZODIAZ UR QL: NEGATIVE
CANNABINOIDS UR QL SCN: NEGATIVE
COCAINE UR QL SCN: NEGATIVE
Lab: ABNORMAL
METHADONE UR QL: NEGATIVE
OPIATES UR QL: NEGATIVE
PCP UR QL: NEGATIVE

## 2025-05-14 PROCEDURE — 99213 OFFICE O/P EST LOW 20 MIN: CPT | Performed by: FAMILY MEDICINE

## 2025-05-14 PROCEDURE — 3074F SYST BP LT 130 MM HG: CPT | Performed by: FAMILY MEDICINE

## 2025-05-14 PROCEDURE — 3079F DIAST BP 80-89 MM HG: CPT | Performed by: FAMILY MEDICINE

## 2025-05-14 RX ORDER — DEXTROAMPHETAMINE SACCHARATE, AMPHETAMINE ASPARTATE, DEXTROAMPHETAMINE SULFATE AND AMPHETAMINE SULFATE 7.5; 7.5; 7.5; 7.5 MG/1; MG/1; MG/1; MG/1
TABLET ORAL
Qty: 60 TABLET | Refills: 0 | Status: CANCELLED | OUTPATIENT
Start: 2025-05-14 | End: 2025-06-13

## 2025-05-14 RX ORDER — DEXTROAMPHETAMINE SACCHARATE, AMPHETAMINE ASPARTATE, DEXTROAMPHETAMINE SULFATE AND AMPHETAMINE SULFATE 7.5; 7.5; 7.5; 7.5 MG/1; MG/1; MG/1; MG/1
TABLET ORAL
Qty: 60 TABLET | Refills: 0 | Status: SHIPPED | OUTPATIENT
Start: 2025-06-13 | End: 2025-07-11

## 2025-05-14 RX ORDER — DEXTROAMPHETAMINE SACCHARATE, AMPHETAMINE ASPARTATE, DEXTROAMPHETAMINE SULFATE AND AMPHETAMINE SULFATE 7.5; 7.5; 7.5; 7.5 MG/1; MG/1; MG/1; MG/1
TABLET ORAL
Qty: 60 TABLET | Refills: 0 | Status: SHIPPED | OUTPATIENT
Start: 2025-07-11 | End: 2025-08-08

## 2025-05-14 RX ORDER — DEXTROAMPHETAMINE SACCHARATE, AMPHETAMINE ASPARTATE, DEXTROAMPHETAMINE SULFATE AND AMPHETAMINE SULFATE 7.5; 7.5; 7.5; 7.5 MG/1; MG/1; MG/1; MG/1
TABLET ORAL
Qty: 60 TABLET | Refills: 0 | Status: CANCELLED | OUTPATIENT
Start: 2025-05-14 | End: 2025-06-11

## 2025-05-14 RX ORDER — DEXTROAMPHETAMINE SACCHARATE, AMPHETAMINE ASPARTATE, DEXTROAMPHETAMINE SULFATE AND AMPHETAMINE SULFATE 7.5; 7.5; 7.5; 7.5 MG/1; MG/1; MG/1; MG/1
TABLET ORAL
Qty: 60 TABLET | Refills: 0 | Status: SHIPPED | OUTPATIENT
Start: 2025-05-14 | End: 2025-06-13

## 2025-05-14 ASSESSMENT — ENCOUNTER SYMPTOMS
BACK PAIN: 0
DIARRHEA: 0
WHEEZING: 0
CONSTIPATION: 0
ABDOMINAL PAIN: 0
CHEST TIGHTNESS: 0
SHORTNESS OF BREATH: 0

## 2025-05-14 NOTE — PROGRESS NOTES
of 38.0 to 38.9 in adult-she is trying to lose weight but has not been able to.  She is very busy at work.  Discussed walking daily and limiting carbs and sweets.    4. History of endometrial cancer-stable and has not had follow-up with GYN.  Discussed annual follow-up with GYN.       Assessment & Plan  1. Attention Deficit Hyperactivity Disorder (ADHD).  - Blood pressure readings are within normal range; pulse rate is elevated.  - Reports no chest pain, heart palpitations, lack of sleep, dizziness, weight loss, chest tightness, shortness of breath, diarrhea, constipation, urinary complaints, back pain, or joint swelling.  - Currently taking one tablet in the morning, half a tablet at noon, and half a tablet at 4 PM; the half tablet at 4 PM does not keep her awake, and she goes to bed by 10-11 PM.  - Urine drug screen will be conducted today; prescription for ADHD medication will be renewed and sent to pharmacy.    2. Post-surgical adhesions.  - History of abdominal surgery performed through ports.  - Advised to be aware of potential adhesions and scar tissue that could cause constipation or bowel blockage.  - If severe abdominal pain occurs, it could indicate a bowel blockage due to previous surgery.  - Monitoring for any signs of bowel obstruction or related complications.       Return in 3 months (on 8/14/2025).         The patient (or guardian, if applicable) and other individuals in attendance with the patient were advised that Artificial Intelligence will be utilized during this visit to record, process the conversation to generate a clinical note, and support improvement of the AI technology. The patient (or guardian, if applicable) and other individuals in attendance at the appointment consented to the use of AI, including the recording.        Meaghan Pena MD

## 2025-08-14 ENCOUNTER — TELEMEDICINE (OUTPATIENT)
Dept: FAMILY MEDICINE CLINIC | Facility: CLINIC | Age: 35
End: 2025-08-14

## 2025-08-14 DIAGNOSIS — F90.0 ADHD, PREDOMINANTLY INATTENTIVE TYPE: ICD-10-CM

## 2025-08-14 PROCEDURE — 99213 OFFICE O/P EST LOW 20 MIN: CPT | Performed by: FAMILY MEDICINE

## 2025-08-14 RX ORDER — DEXTROAMPHETAMINE SACCHARATE, AMPHETAMINE ASPARTATE, DEXTROAMPHETAMINE SULFATE AND AMPHETAMINE SULFATE 7.5; 7.5; 7.5; 7.5 MG/1; MG/1; MG/1; MG/1
TABLET ORAL
Qty: 60 TABLET | Refills: 0 | Status: SHIPPED | OUTPATIENT
Start: 2025-08-14 | End: 2025-09-11

## 2025-08-14 RX ORDER — DEXTROAMPHETAMINE SACCHARATE, AMPHETAMINE ASPARTATE, DEXTROAMPHETAMINE SULFATE AND AMPHETAMINE SULFATE 7.5; 7.5; 7.5; 7.5 MG/1; MG/1; MG/1; MG/1
TABLET ORAL
Qty: 60 TABLET | Refills: 0 | Status: SHIPPED | OUTPATIENT
Start: 2025-08-14 | End: 2025-09-17

## 2025-08-14 RX ORDER — DEXTROAMPHETAMINE SACCHARATE, AMPHETAMINE ASPARTATE, DEXTROAMPHETAMINE SULFATE AND AMPHETAMINE SULFATE 7.5; 7.5; 7.5; 7.5 MG/1; MG/1; MG/1; MG/1
TABLET ORAL
Qty: 60 TABLET | Refills: 0 | Status: SHIPPED | OUTPATIENT
Start: 2025-08-14 | End: 2025-09-13

## 2025-08-14 ASSESSMENT — ENCOUNTER SYMPTOMS
SHORTNESS OF BREATH: 0
CHEST TIGHTNESS: 0